# Patient Record
Sex: MALE | Race: OTHER | Employment: FULL TIME | URBAN - METROPOLITAN AREA
[De-identification: names, ages, dates, MRNs, and addresses within clinical notes are randomized per-mention and may not be internally consistent; named-entity substitution may affect disease eponyms.]

---

## 2024-08-15 ENCOUNTER — ANESTHESIA EVENT (OUTPATIENT)
Age: 24
End: 2024-08-15
Payer: COMMERCIAL

## 2024-08-19 ENCOUNTER — ANESTHESIA (OUTPATIENT)
Age: 24
End: 2024-08-19
Payer: COMMERCIAL

## 2024-08-19 ENCOUNTER — HOSPITAL ENCOUNTER (OUTPATIENT)
Age: 24
Setting detail: OUTPATIENT SURGERY
Discharge: HOME | End: 2024-08-19
Attending: INTERNAL MEDICINE | Admitting: INTERNAL MEDICINE
Payer: COMMERCIAL

## 2024-08-19 VITALS
BODY MASS INDEX: 27.4 KG/M2 | RESPIRATION RATE: 13 BRPM | HEART RATE: 47 BPM | SYSTOLIC BLOOD PRESSURE: 107 MMHG | WEIGHT: 185 LBS | OXYGEN SATURATION: 99 % | TEMPERATURE: 97.8 F | DIASTOLIC BLOOD PRESSURE: 73 MMHG | HEIGHT: 69 IN

## 2024-08-19 DIAGNOSIS — K51.20 CHRONIC ULCERATIVE PROCTITIS WITHOUT COMPLICATIONS (HCC): ICD-10-CM

## 2024-08-19 PROCEDURE — 7100000011 HC PHASE II RECOVERY - ADDTL 15 MIN: Performed by: INTERNAL MEDICINE

## 2024-08-19 PROCEDURE — 7100000010 HC PHASE II RECOVERY - FIRST 15 MIN: Performed by: INTERNAL MEDICINE

## 2024-08-19 PROCEDURE — 3609010300 HC COLONOSCOPY W/BIOPSY SINGLE/MULTIPLE: Performed by: INTERNAL MEDICINE

## 2024-08-19 PROCEDURE — 2580000003 HC RX 258: Performed by: ANESTHESIOLOGY

## 2024-08-19 PROCEDURE — 88305 TISSUE EXAM BY PATHOLOGIST: CPT

## 2024-08-19 PROCEDURE — 3700000001 HC ADD 15 MINUTES (ANESTHESIA): Performed by: INTERNAL MEDICINE

## 2024-08-19 PROCEDURE — 6360000002 HC RX W HCPCS: Performed by: NURSE ANESTHETIST, CERTIFIED REGISTERED

## 2024-08-19 PROCEDURE — 2580000003 HC RX 258: Performed by: NURSE ANESTHETIST, CERTIFIED REGISTERED

## 2024-08-19 PROCEDURE — 3700000000 HC ANESTHESIA ATTENDED CARE: Performed by: INTERNAL MEDICINE

## 2024-08-19 PROCEDURE — 2709999900 HC NON-CHARGEABLE SUPPLY: Performed by: INTERNAL MEDICINE

## 2024-08-19 RX ORDER — SODIUM CHLORIDE 0.9 % (FLUSH) 0.9 %
5-40 SYRINGE (ML) INJECTION PRN
Status: DISCONTINUED | OUTPATIENT
Start: 2024-08-19 | End: 2024-08-19 | Stop reason: HOSPADM

## 2024-08-19 RX ORDER — LIDOCAINE HYDROCHLORIDE 20 MG/ML
INJECTION, SOLUTION INTRAVENOUS PRN
Status: DISCONTINUED | OUTPATIENT
Start: 2024-08-19 | End: 2024-08-19 | Stop reason: SDUPTHER

## 2024-08-19 RX ORDER — SODIUM CHLORIDE 9 MG/ML
INJECTION, SOLUTION INTRAVENOUS PRN
Status: DISCONTINUED | OUTPATIENT
Start: 2024-08-19 | End: 2024-08-19 | Stop reason: HOSPADM

## 2024-08-19 RX ORDER — SODIUM CHLORIDE 0.9 % (FLUSH) 0.9 %
5-40 SYRINGE (ML) INJECTION EVERY 12 HOURS SCHEDULED
Status: DISCONTINUED | OUTPATIENT
Start: 2024-08-19 | End: 2024-08-19 | Stop reason: HOSPADM

## 2024-08-19 RX ORDER — NALOXONE HYDROCHLORIDE 0.4 MG/ML
INJECTION, SOLUTION INTRAMUSCULAR; INTRAVENOUS; SUBCUTANEOUS PRN
Status: DISCONTINUED | OUTPATIENT
Start: 2024-08-19 | End: 2024-08-19 | Stop reason: HOSPADM

## 2024-08-19 RX ORDER — SODIUM CHLORIDE 9 MG/ML
INJECTION, SOLUTION INTRAVENOUS CONTINUOUS PRN
Status: DISCONTINUED | OUTPATIENT
Start: 2024-08-19 | End: 2024-08-19 | Stop reason: SDUPTHER

## 2024-08-19 RX ORDER — PROPOFOL 10 MG/ML
INJECTION, EMULSION INTRAVENOUS PRN
Status: DISCONTINUED | OUTPATIENT
Start: 2024-08-19 | End: 2024-08-19 | Stop reason: SDUPTHER

## 2024-08-19 RX ADMIN — LIDOCAINE HYDROCHLORIDE 100 MG: 20 INJECTION, SOLUTION INTRAVENOUS at 07:48

## 2024-08-19 RX ADMIN — SODIUM CHLORIDE 1000 ML: 9 INJECTION, SOLUTION INTRAVENOUS at 06:49

## 2024-08-19 RX ADMIN — PROPOFOL 100 MG: 10 INJECTION, EMULSION INTRAVENOUS at 07:48

## 2024-08-19 RX ADMIN — SODIUM CHLORIDE: 9 INJECTION, SOLUTION INTRAVENOUS at 07:45

## 2024-08-19 RX ADMIN — PROPOFOL 250 MCG/KG/MIN: 10 INJECTION, EMULSION INTRAVENOUS at 07:49

## 2024-08-19 ASSESSMENT — PAIN - FUNCTIONAL ASSESSMENT: PAIN_FUNCTIONAL_ASSESSMENT: 0-10

## 2024-08-19 ASSESSMENT — LIFESTYLE VARIABLES: SMOKING_STATUS: 0

## 2024-08-19 NOTE — ANESTHESIA PRE PROCEDURE
24  Pulse  Av  Min: 46   Min taken time: 24  Max: 46   Max taken time: 24  Resp  Av  Min: 18   Min taken time: 24  Max: 18   Max taken time: 24  BP  Min: 109/67   Min taken time: 24  Max: 109/67   Max taken time: 24  SpO2  Av %  Min: 100 %   Min taken time: 24  Max: 100 %   Max taken time: 24  BP Readings from Last 3 Encounters:   24 109/67       BMI  Body mass index is 27.32 kg/m².  Estimated body mass index is 27.32 kg/m² as calculated from the following:    Height as of this encounter: 1.753 m (5' 9\").    Weight as of this encounter: 83.9 kg (185 lb).    CBC No results found for: \"WBC\", \"RBC\", \"HGB\", \"HCT\", \"MCV\", \"RDW\", \"PLT\"  CMP  No results found for: \"NA\", \"K\", \"CL\", \"CO2\", \"BUN\", \"CREATININE\", \"GFRAA\", \"AGRATIO\", \"LABGLOM\", \"GLUCOSE\", \"GLU\", \"CALCIUM\", \"BILITOT\", \"ALKPHOS\", \"AST\", \"ALT\"  BMP  No results found for: \"NA\", \"K\", \"CL\", \"CO2\", \"BUN\", \"CREATININE\", \"CALCIUM\", \"GFRAA\", \"LABGLOM\", \"GLUCOSE\", \"GLU\"  POCGlucose  No results for input(s): \"GLUCOSE\" in the last 72 hours.   Coags  No results found for: \"PROTIME\", \"INR\", \"APTT\"  HCG (If Applicable) No results found for: \"PREGTESTUR\", \"PREGSERUM\", \"HCG\", \"HCGQUANT\"   ABGs No results found for: \"PHART\", \"PO2ART\", \"UWZ2XBO\", \"SRC9BSS\", \"BEART\", \"O3RZCUQW\"   Type & Screen (If Applicable)  No results found for: \"LABABO\"                         BMI: Wt Readings from Last 3 Encounters:       NPO Status:  >8h solids, >2 h clears                          Anesthesia Evaluation  Patient summary reviewed   no history of anesthetic complications:   Airway: Mallampati: II  TM distance: >3 FB   Neck ROM: full  Mouth opening: > = 3 FB   Dental: normal exam     Comment: No loose teeth per patient    Pulmonary:       (-) COPD, asthma, sleep apnea and not a current smoker                           Cardiovascular:  Exercise tolerance: good (>4 METS)      (-)

## 2024-08-19 NOTE — DISCHARGE INSTRUCTIONS
COLONSCOPY DISCHARGE INSTRUCTIONS    Follow-up biopsy results in the next 2 weeks.  Regular diet.    You may experience some lightheadedness for the next several hours.  Plan on quiet relaxation for the rest of today. Nap for four hours following procedure if possible.  A responsible adult needs to stay with you today.    Eat bland food and avoid anything greasy or spicy initially-progress to your normal diet gradually.  Diet restrictions as instructed.  You may resume home medications as instructed.    It is not unusual to experience some mild cramping or gas pains, and you may not have a bowel movement for several days.  If you had a polyp removed, avoid strenuous activity for 48 hours.  Avoid the use of aspirin or related compounds for one week, unless otherwise instructed by your physician.    You may notice a small amount of blood in your next few bowel movements, but if a large amount passes, call your physician.    If you have any of the following problems, notify your physician or return to the hospital emergency room : fever, chills, excessive bleeding, excessive vomiting, difficulty swallowing, uncontrolled pain, increased abdominal distention, shortness of breath or any other problems.    Call your doctor at [phone] if you have any concerns.     If you had any biopsies or polyps call for results in 5-7 business days.    See your physician's report for details about your procedure and recommendations.      ANESTHESIA DISCHARGE INSTRUCTIONS    Wear your seatbelt home.    You are under the influence of drugs-do not drink alcohol, drive ,operate machinery,or make any important decisions or sign any legal documentsfor 24 hours. You may resume normal activities tomorrow.    A responsible adult needs to be with you for 24 hours.    You may experience lightheadedness,dizziness,or sleepiness following surgery.    Rest at home today- increase activity as tolerated. It is recommended to take a four hour nap

## 2024-08-19 NOTE — H&P
Gastroenterology Note             Pre-operative History and Physical    Patient: Sadi Carney  : 2000  CSN: [id number]    History Obtained From:  patient and/or guardian.     HISTORY OF PRESENT ILLNESS:    The patient is a 24 y.o. male  here for ulcerative colitis for 8 years-originally diagnosed in 2016.  Patient was told that he has ulcerative proctocolitis.  Currently on Remicade infusions.  Denies any abdominal pain or rectal bleeding.  His last colonoscopy was more than 4 years ago and he was advised by his doctors at [facility] to have a colonoscopy done.      Past Medical History:    Past Medical History:   Diagnosis Date    Ulcerative colitis (HCC)      Past Surgical History:    History reviewed. No pertinent surgical history.  Medications Prior to Admission:   No current facility-administered medications on file prior to encounter.     Current Outpatient Medications on File Prior to Encounter   Medication Sig Dispense Refill    sodium chloride 0.9 % SOLN 170 mL with inFLIXimab 100 MG SOLR 800 mg Infuse 800 mg intravenously Once every 6 weeks          Allergies:  Patient has no known allergies.      Social History:   Social History     Tobacco Use    Smoking status: Never    Smokeless tobacco: Never   Substance Use Topics    Alcohol use: Yes     Alcohol/week: 1.0 standard drink of alcohol     Types: 1 Cans of beer per week     Family History:   History reviewed. No pertinent family history.    PHYSICAL EXAM:      BP (!) 93/58   Pulse 56   Temp 97.8 °F (36.6 °C) (Infrared)   Resp 14   Ht 1.753 m (5' 9\")   Wt 83.9 kg (185 lb)   SpO2 99%   BMI 27.32 kg/m²  I        Heart:   within normal limits    Lungs:  Unlabored    Abdomen:   soft, NT, ND      ASA Grade:  ASA 2 - Patient with mild systemic disease with no functional limitations    Mallampati Class: 2      ASSESSMENT AND PLAN:    1.  Patient is a 24 y.o. male here for Colonoscopy.   2.  Procedure options, risks and

## 2024-08-19 NOTE — PROGRESS NOTES
Pt discharged via wheelchair. Pt discharged with  all belongings. Mother: Cristopher taking stable pt home.    
[facility] has verified that procedural consent is correctly filled out prior to the start of the procedure.    
Discharge instructions review with patient and Mother. All home medications have been reviewed, pt v/u.   
Pt arrived [facility]. Reported received from RN/CRNA staff. Pt on RA, NSR, and VSS. Will continue to monitor.   
Teaching / education initiated regarding perioperative experience, expectations, and pain management during stay. Patient verbalized understanding.    
pump, keep set on basal rate on day of surgery. Long-acting insulin should be administered the evening before, take only half of usual dose. Always check with prescribing doctor if there are any questions.   [] Do not give any correction doses of insulin the morning of procedure.  [] GLP-1 inhibitor medications should be stopped 7 days prior to surgery.  [] Do not take any oral diabetic medications the morning of procedure.    []  You should shower the night before or the morning of surgery with an antibacterial soap i.e. safeguard or dial. Your surgeon may require you to use Hibiclens (an antiseptic skin cleanser) please follow physician’s instructions.     []  Do not shave or wax operative site 96 hours (4 days) prior to procedure.      [x]  No jewelry including body piercings, no makeup, or nail polish. No lotion, powders, creams, perfumes or metal hairclips.     [x]  Dress in loose comfortable clothing. Leave all valuables at home.    [x]  Please contact your surgeon if you are taking blood thinners, aspirin, anti-inflammatory medications, vitamins, or fish oil. They may require you to stop taking them 5-7 days prior.             ITEMS TO BRING TO THE HOSPITAL ON DOS:     []  Your prescribed rescue inhaler     [x]  ID and insurance card, form of payment if have co-pay, current medication list, living will and POA (if you have one).     []  Home c-pap and/or home O2 oxygen tank.      []  Any assistive medical devices (i.e.Walker, cane, wheelchair, etc.) or immobilizer or sling needed postoperatively      [x]  You may bring dentures, glasses, contacts, and/or hearing aids, however, they will need to be removed before your procedure. Please bring cases to store them in for safekeeping and leave them with the person you came with.        Our goal is to provide you with safe and excellent care. Please contact pre-admission testing if you have any further questions. (Please note, these are generalized instructions for

## 2024-08-19 NOTE — ANESTHESIA POSTPROCEDURE EVALUATION
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    Jose Luis Phase I: Jose Luis Score: 10    Jose Luis Phase II: Jose Luis Score: 10    Anesthesia Post Evaluation    Patient location during evaluation: PACU  Patient participation: complete - patient participated  Level of consciousness: awake and alert  Pain score: 0  Airway patency: patent  Nausea & Vomiting: no nausea and no vomiting  Cardiovascular status: blood pressure returned to baseline  Respiratory status: acceptable  Hydration status: euvolemic  Pain management: adequate    No notable events documented.

## 2024-08-20 LAB — SURGICAL PATHOLOGY REPORT: NORMAL

## 2024-12-11 ENCOUNTER — OFFICE VISIT (OUTPATIENT)
Age: 24
End: 2024-12-11
Payer: COMMERCIAL

## 2024-12-11 VITALS
HEART RATE: 72 BPM | WEIGHT: 194.45 LBS | OXYGEN SATURATION: 97 % | HEIGHT: 69 IN | TEMPERATURE: 96.5 F | SYSTOLIC BLOOD PRESSURE: 149 MMHG | DIASTOLIC BLOOD PRESSURE: 88 MMHG | BODY MASS INDEX: 28.8 KG/M2

## 2024-12-11 DIAGNOSIS — J35.1 TONSILLAR HYPERTROPHY: ICD-10-CM

## 2024-12-11 DIAGNOSIS — R04.2 HEMOPTYSIS: Primary | ICD-10-CM

## 2024-12-11 PROCEDURE — 31575 DIAGNOSTIC LARYNGOSCOPY: CPT | Performed by: OTOLARYNGOLOGY

## 2024-12-11 PROCEDURE — 99203 OFFICE O/P NEW LOW 30 MIN: CPT | Performed by: OTOLARYNGOLOGY

## 2024-12-11 RX ORDER — EPINEPHRINE 0.3 MG/.3ML
0.3 INJECTION SUBCUTANEOUS SEE ADMIN INSTRUCTIONS
COMMUNITY
Start: 2024-07-13

## 2024-12-11 RX ORDER — ATOMOXETINE 25 MG/1
25 CAPSULE ORAL EVERY MORNING
COMMUNITY
Start: 2024-06-03

## 2024-12-11 RX ORDER — KETOCONAZOLE 20 MG/ML
SHAMPOO, SUSPENSION TOPICAL
COMMUNITY
Start: 2024-06-17

## 2024-12-11 RX ORDER — METHYLPHENIDATE HYDROCHLORIDE 36 MG/1
36 TABLET ORAL DAILY
COMMUNITY
Start: 2024-12-03

## 2024-12-11 RX ORDER — INFLIXIMAB 100 MG/10ML
100 INJECTION, POWDER, LYOPHILIZED, FOR SOLUTION INTRAVENOUS
COMMUNITY

## 2024-12-11 NOTE — PROGRESS NOTES
CHIEF COMPLAINT: Hemoptysis    HISTORY OF PRESENT ILLNESS:  24 y.o. male who presents with hemoptysis.  Occurred 1 day last week.  He woke up in the morning and coughed up some bright red blood.  It stopped immediately.  The next day he coughed up some blood tinged sputum.  It has not recurred since.  He has no throat pain.  He has no hoarseness.  He has no dysphagia.  He has no airway concerns.  He has never smoked.  In addition, he has a concern of asymmetry of the tonsils.  The right tonsil is always been larger than the left.  He does not snore at night he has no pain in that area.  He has no \"B\" symptoms  PAST MEDICAL HISTORY:   Social History     Tobacco Use   Smoking Status Never   Smokeless Tobacco Never                                                    Social History     Substance and Sexual Activity   Alcohol Use Yes    Alcohol/week: 1.0 standard drink of alcohol    Types: 1 Cans of beer per week                                                    Current Outpatient Medications:     sodium chloride 0.9 % SOLN 250 mL with inFLIXimab 100 MG SOLR, Infuse intravenously, Disp: , Rfl:     atomoxetine (STRATTERA) 25 MG capsule, Take 1 capsule by mouth every morning, Disp: , Rfl:     EPINEPHrine (EPIPEN) 0.3 MG/0.3ML SOAJ injection, Inject 0.3 mLs into the muscle See Admin Instructions, Disp: , Rfl:     inFLIXimab (REMICADE) 100 MG injection, 10 mLs, Disp: , Rfl:     ketoconazole (NIZORAL) 2 % shampoo, Apply to scalp, allow to sit for 5-10 mins, then rinse. Can use daily or 1-3 times per week, prn, Disp: , Rfl:     methylphenidate (CONCERTA) 36 MG extended release tablet, Take 1 tablet by mouth daily. as directed, Disp: , Rfl:     sodium chloride 0.9 % SOLN 170 mL with inFLIXimab 100 MG SOLR 800 mg, Infuse 800 mg intravenously Once every 6 weeks, Disp: , Rfl:                                                  Past Medical History:   Diagnosis Date    Ulcerative colitis (HCC)

## (undated) DEVICE — AIR/WATER CLEANING ADAPTER FOR OLYMPUS® GI ENDOSCOPE: Brand: BULLDOG®

## (undated) DEVICE — SYRINGE,LUER LOCK,STERILE,60ML,PUMPC: Brand: MEDLINE

## (undated) DEVICE — BW-412T DISP COMBO CLEANING BRUSH: Brand: SINGLE USE COMBINATION CLEANING BRUSH

## (undated) DEVICE — ENDOSCOPIC KIT 1.1+ 6 FT 2 GWN AAMI LEVEL 3

## (undated) DEVICE — TUBING, SUCTION, 1/4" X 12', STRAIGHT: Brand: MEDLINE

## (undated) DEVICE — SINGLE USE AIR/WATER, SUCTION AND BIOPSY VALVES SET: Brand: ORCAPOD™

## (undated) DEVICE — SOLUTION IRRIG 1000ML STRL H2O USP PLAS POUR BTL

## (undated) DEVICE — FORCEPS BX L240CM WRK CHN 2.8MM STD CAP W/ NDL MIC MESH